# Patient Record
Sex: MALE | Race: WHITE | NOT HISPANIC OR LATINO | Employment: FULL TIME | URBAN - METROPOLITAN AREA
[De-identification: names, ages, dates, MRNs, and addresses within clinical notes are randomized per-mention and may not be internally consistent; named-entity substitution may affect disease eponyms.]

---

## 2018-09-10 ENCOUNTER — APPOINTMENT (OUTPATIENT)
Dept: LAB | Age: 43
End: 2018-09-10
Attending: PREVENTIVE MEDICINE

## 2018-09-10 ENCOUNTER — TRANSCRIBE ORDERS (OUTPATIENT)
Dept: ADMINISTRATIVE | Age: 43
End: 2018-09-10

## 2018-09-10 DIAGNOSIS — Z00.8 HEALTH EXAMINATION IN POPULATION SURVEY: Primary | ICD-10-CM

## 2018-09-10 DIAGNOSIS — Z00.8 HEALTH EXAMINATION IN POPULATION SURVEY: ICD-10-CM

## 2018-09-10 LAB
CHOLEST SERPL-MCNC: 169 MG/DL (ref 50–200)
EST. AVERAGE GLUCOSE BLD GHB EST-MCNC: 108 MG/DL
HBA1C MFR BLD: 5.4 % (ref 4.2–6.3)
HDLC SERPL-MCNC: 35 MG/DL (ref 40–60)
LDLC SERPL CALC-MCNC: 97 MG/DL (ref 0–100)
NONHDLC SERPL-MCNC: 134 MG/DL
PSA SERPL-MCNC: 0.8 NG/ML (ref 0–4)
TRIGL SERPL-MCNC: 185 MG/DL

## 2018-09-10 PROCEDURE — 36415 COLL VENOUS BLD VENIPUNCTURE: CPT

## 2018-09-10 PROCEDURE — 83036 HEMOGLOBIN GLYCOSYLATED A1C: CPT

## 2018-09-10 PROCEDURE — 80061 LIPID PANEL: CPT

## 2018-09-10 PROCEDURE — 84153 ASSAY OF PSA TOTAL: CPT

## 2020-04-19 ENCOUNTER — HOSPITAL ENCOUNTER (EMERGENCY)
Facility: HOSPITAL | Age: 45
Discharge: HOME | End: 2020-04-19
Attending: EMERGENCY MEDICINE | Admitting: EMERGENCY MEDICINE
Payer: OTHER MISCELLANEOUS

## 2020-04-19 ENCOUNTER — APPOINTMENT (EMERGENCY)
Dept: RADIOLOGY | Facility: HOSPITAL | Age: 45
End: 2020-04-19
Payer: OTHER MISCELLANEOUS

## 2020-04-19 VITALS
DIASTOLIC BLOOD PRESSURE: 58 MMHG | RESPIRATION RATE: 16 BRPM | TEMPERATURE: 97.5 F | HEIGHT: 70 IN | WEIGHT: 202.44 LBS | HEART RATE: 58 BPM | SYSTOLIC BLOOD PRESSURE: 110 MMHG | BODY MASS INDEX: 28.98 KG/M2 | OXYGEN SATURATION: 97 %

## 2020-04-19 DIAGNOSIS — S61.211A LACERATION OF LEFT INDEX FINGER WITHOUT FOREIGN BODY WITHOUT DAMAGE TO NAIL, INITIAL ENCOUNTER: Primary | ICD-10-CM

## 2020-04-19 PROCEDURE — 12001 RPR S/N/AX/GEN/TRNK 2.5CM/<: CPT | Performed by: EMERGENCY MEDICINE

## 2020-04-19 PROCEDURE — 73140 X-RAY EXAM OF FINGER(S): CPT

## 2020-04-19 PROCEDURE — 99283 EMERGENCY DEPT VISIT LOW MDM: CPT

## 2020-04-19 PROCEDURE — 90715 TDAP VACCINE 7 YRS/> IM: CPT | Performed by: EMERGENCY MEDICINE

## 2020-04-19 PROCEDURE — 99282 EMERGENCY DEPT VISIT SF MDM: CPT | Performed by: EMERGENCY MEDICINE

## 2020-04-19 PROCEDURE — 90471 IMMUNIZATION ADMIN: CPT

## 2020-04-19 RX ORDER — GINSENG 100 MG
1 CAPSULE ORAL ONCE
Status: COMPLETED | OUTPATIENT
Start: 2020-04-19 | End: 2020-04-19

## 2020-04-19 RX ORDER — LIDOCAINE HYDROCHLORIDE 10 MG/ML
5 INJECTION, SOLUTION EPIDURAL; INFILTRATION; INTRACAUDAL; PERINEURAL ONCE
Status: COMPLETED | OUTPATIENT
Start: 2020-04-19 | End: 2020-04-19

## 2020-04-19 RX ADMIN — BACITRACIN 1 SMALL APPLICATION: 500 OINTMENT TOPICAL at 07:46

## 2020-04-19 RX ADMIN — LIDOCAINE HYDROCHLORIDE 5 ML: 10 INJECTION, SOLUTION EPIDURAL; INFILTRATION; INTRACAUDAL; PERINEURAL at 07:46

## 2020-04-19 RX ADMIN — TETANUS TOXOID, REDUCED DIPHTHERIA TOXOID AND ACELLULAR PERTUSSIS VACCINE, ADSORBED 0.5 ML: 5; 2.5; 8; 8; 2.5 SUSPENSION INTRAMUSCULAR at 07:46

## 2021-03-26 DIAGNOSIS — Z23 ENCOUNTER FOR IMMUNIZATION: ICD-10-CM

## 2021-06-09 ENCOUNTER — TELEPHONE (OUTPATIENT)
Dept: FAMILY MEDICINE CLINIC | Facility: CLINIC | Age: 46
End: 2021-06-09

## 2024-05-25 ENCOUNTER — HOSPITAL ENCOUNTER (EMERGENCY)
Facility: HOSPITAL | Age: 49
Discharge: HOME | End: 2024-05-26
Attending: EMERGENCY MEDICINE | Admitting: EMERGENCY MEDICINE
Payer: COMMERCIAL

## 2024-05-25 ENCOUNTER — APPOINTMENT (OUTPATIENT)
Dept: RADIOLOGY | Facility: HOSPITAL | Age: 49
End: 2024-05-25
Payer: COMMERCIAL

## 2024-05-25 ENCOUNTER — APPOINTMENT (EMERGENCY)
Dept: CT IMAGING | Facility: HOSPITAL | Age: 49
End: 2024-05-25
Payer: COMMERCIAL

## 2024-05-25 VITALS
BODY MASS INDEX: 34.36 KG/M2 | OXYGEN SATURATION: 95 % | HEIGHT: 70 IN | SYSTOLIC BLOOD PRESSURE: 129 MMHG | RESPIRATION RATE: 20 BRPM | TEMPERATURE: 98.7 F | WEIGHT: 240 LBS | HEART RATE: 81 BPM | DIASTOLIC BLOOD PRESSURE: 75 MMHG

## 2024-05-25 DIAGNOSIS — S20.219A RIB CONTUSION: Primary | ICD-10-CM
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
## 2024-05-25 PROCEDURE — 99284 EMERGENCY DEPT VISIT MOD MDM: CPT

## 2024-05-25 PROCEDURE — 71101 X-RAY EXAM UNILAT RIBS/CHEST: CPT

## 2024-05-25 PROCEDURE — 71250 CT THORAX DX C-: CPT

## 2024-05-25 RX ORDER — OXYCODONE HYDROCHLORIDE 5 MG/1
5 TABLET ORAL ONCE
Status: COMPLETED | OUTPATIENT
Start: 2024-05-25 | End: 2024-05-25

## 2024-05-25 RX ADMIN — OXYCODONE HYDROCHLORIDE 5 MG: 5 TABLET ORAL at 22:13

## 2024-05-26 NOTE — ED PROVIDER NOTES
History  Chief Complaint   Patient presents with    Fall     Pt states that he slipped and fell around 430pm today. Pt denies hitting his head. Pt denies LOC. Pt denies taking any blood thinners. Pt states that his right rib area is hurting      Patient is a 49 yo M no significant past medical history arriving for evaluation after mechanical fall when he slipped on his deck.  Patient states that he had on the right side of his ribs.  Patient denies any abdominal pain, pelvis pain.  Patient is ambulatory without any gait dysfunction.  Patient denies any head strike, denies any LOC.  Patient denies any nausea, vomiting.  Patient denies any neurologic changes.  Patient complaining of right rib pain.  Patient is point tender over his fifth sixth and seventh ribs at the mid axillary line.  Patient denies any shortness of breath, difficulty breathing.  Patient has no hypoxia upon arrival.  Denies any anticoagulation, antiplatelets including aspirin.  Patient states he took NSAID prior to arrival.        None       History reviewed. No pertinent past medical history.    Past Surgical History:   Procedure Laterality Date    NASAL POLYP EXCISION      TESTICLE SURGERY         History reviewed. No pertinent family history.  I have reviewed and agree with the history as documented.    E-Cigarette/Vaping    E-Cigarette Use Never User      E-Cigarette/Vaping Substances     Social History     Tobacco Use    Smoking status: Former    Smokeless tobacco: Never   Vaping Use    Vaping status: Never Used   Substance Use Topics    Alcohol use: Yes    Drug use: Not Currently       Review of Systems   Constitutional: Negative.    HENT: Negative.     Eyes: Negative.    Respiratory: Negative.     Cardiovascular: Negative.    Gastrointestinal: Negative.    Endocrine: Negative.    Genitourinary: Negative.    Musculoskeletal: Negative.    Allergic/Immunologic: Negative.    Neurological: Negative.    Hematological: Negative.     Psychiatric/Behavioral: Negative.     All other systems reviewed and are negative.      Physical Exam  Physical Exam  Vitals and nursing note reviewed.   Constitutional:       Appearance: Normal appearance. He is normal weight.   HENT:      Head: Normocephalic.      Right Ear: External ear normal.      Left Ear: External ear normal.      Nose: Nose normal.      Mouth/Throat:      Mouth: Mucous membranes are moist.   Eyes:      Extraocular Movements: Extraocular movements intact.      Conjunctiva/sclera: Conjunctivae normal.      Pupils: Pupils are equal, round, and reactive to light.   Cardiovascular:      Rate and Rhythm: Normal rate and regular rhythm.      Pulses: Normal pulses.      Heart sounds: Normal heart sounds.   Pulmonary:      Effort: Pulmonary effort is normal. No respiratory distress.      Breath sounds: Normal breath sounds. No stridor. No wheezing, rhonchi or rales.   Chest:      Chest wall: No tenderness.   Abdominal:      General: Abdomen is flat. Bowel sounds are normal. There is no distension.      Palpations: There is no mass.      Tenderness: There is no abdominal tenderness. There is no right CVA tenderness, left CVA tenderness, guarding or rebound.      Hernia: No hernia is present.   Musculoskeletal:         General: Tenderness present.        Arms:       Cervical back: Normal range of motion and neck supple.   Skin:     General: Skin is warm.      Capillary Refill: Capillary refill takes less than 2 seconds.   Neurological:      General: No focal deficit present.      Mental Status: He is alert and oriented to person, place, and time. Mental status is at baseline.   Psychiatric:         Mood and Affect: Mood normal.         Behavior: Behavior normal.         Thought Content: Thought content normal.         Judgment: Judgment normal.         Vital Signs  ED Triage Vitals [05/25/24 2048]   Temperature Pulse Respirations Blood Pressure SpO2   98.7 °F (37.1 °C) 81 20 129/75 95 %      Temp  Source Heart Rate Source Patient Position - Orthostatic VS BP Location FiO2 (%)   Temporal -- -- -- --      Pain Score       --           Vitals:    05/25/24 2048   BP: 129/75   Pulse: 81         Visual Acuity  Visual Acuity      Flowsheet Row Most Recent Value   L Pupil Size (mm) 3   R Pupil Size (mm) 3            ED Medications  Medications   oxyCODONE (ROXICODONE) IR tablet 5 mg (5 mg Oral Given 5/25/24 8543)       Diagnostic Studies  Results Reviewed       None                   CT chest without contrast   Final Result by Neela Bar MD (05/26 0005)      No evidence of acute intrathoracic injury.               Workstation performed: [id number]         XR ribs with pa chest min 3 views RIGHT    (Results Pending)              Procedures  Procedures         ED Course                                             Medical Decision Making  Differential diagnosis including rib contusion, rib fracture  Patient presenting after he had a mechanical fall earlier this evening.  Patient denies any head strike, denies any LOC.  Patient has no shortness of breath. Denies abdominal pain. Patient has point tenderness over his right lateral ribs.  No obvious x-ray findings were appreciated per my interpretation of chest x-ray ordered by nurses.  Due to continued pain, and concern for limited view of x-ray, and possible rib fracture, CT chest without contrast was ordered.  No acute findings were noted.  Discussed with patient we will treat for rib contusion which is similar in nature to rib fracture.  Patient provided with incentive spirometry, demonstrated adequate inspiration.  Reviewed timing throughout the day to decrease likelihood of pneumonia.  Discussed return precautions. Pain improved with dose of oxycodone in department. Discussed tylenol and motrin as needed at home.   Reviewed reasons to return to ed.  Patient verbalized understanding of diagnosis and agreement with discharge plan of care as well as understanding  of reasons to return to ed.      Amount and/or Complexity of Data Reviewed  Radiology: ordered.    Risk  Prescription drug management.             Disposition  Final diagnoses:   Rib contusion   Fall, initial encounter     Time reflects when diagnosis was documented in both MDM as applicable and the Disposition within this note       Time User Action Codes Description Comment    5/26/2024 12:19 AM Farrah Calixto [S20.219A] Rib contusion     5/26/2024 12:20 AM Farrah Calixto [W19.XXXA] Fall, initial encounter           ED Disposition       ED Disposition   Discharge    Condition   Stable    Date/Time   Sun May 26, 2024 12:19 AM    Comment   Raffi Lord discharge to home/self care.                   Follow-up Information       Follow up With Specialties Details Why Contact Info Additional Information     [facility] Emergency Department Emergency Medicine Go to  If symptoms worsen [address] [facility] [id number] [phone] [facility] Emergency Department, [address] [place], [place] [address]            Patient's Medications    No medications on file       No discharge procedures on file.    PDMP Review       None            ED Provider  Electronically Signed by             RAJI Wise  05/26/24 0038       RAJI Wise  05/26/24 0039